# Patient Record
Sex: FEMALE | Race: WHITE
[De-identification: names, ages, dates, MRNs, and addresses within clinical notes are randomized per-mention and may not be internally consistent; named-entity substitution may affect disease eponyms.]

---

## 2017-05-03 ENCOUNTER — HOSPITAL ENCOUNTER (OUTPATIENT)
Dept: HOSPITAL 63 - DXRAD | Age: 62
Discharge: HOME | End: 2017-05-03
Attending: GENERAL PRACTICE
Payer: COMMERCIAL

## 2017-05-03 DIAGNOSIS — R60.9: ICD-10-CM

## 2017-05-03 DIAGNOSIS — M19.071: Primary | ICD-10-CM

## 2017-05-03 PROCEDURE — 73630 X-RAY EXAM OF FOOT: CPT

## 2017-05-03 NOTE — RAD
Right foot, 3 views, 5/3/2017:



History: Foot pain and swelling



No fracture or dislocation is identified. No destructive bony lesion is seen.

There is only minimal degenerative change at scattered interphalangeal joints.

Minimal arterial calcifications are noted. There is mild subcutaneous edema

about the foot.



IMPRESSION: No acute bony abnormality is detected.

## 2020-06-26 ENCOUNTER — HOSPITAL ENCOUNTER (OUTPATIENT)
Dept: HOSPITAL 63 - MAMMO | Age: 65
Discharge: HOME | End: 2020-06-26
Attending: FAMILY MEDICINE
Payer: MEDICARE

## 2020-06-26 DIAGNOSIS — Z12.31: Primary | ICD-10-CM

## 2020-06-26 PROCEDURE — 77067 SCR MAMMO BI INCL CAD: CPT

## 2020-06-26 PROCEDURE — 77063 BREAST TOMOSYNTHESIS BI: CPT

## 2020-06-26 NOTE — RAD
DATE: 6/26/2020 9:00 AM



EXAM: MAMMO EVENS SCREENING BILATERAL



HISTORY:  Screening. . New baseline as previous mammograms from 2005 are no

longer available. 



COMPARISON: None available



TECHNIQUE:



Bilateral CC and MLO views of the breasts were performed. Bilateral breast

tomosynthesis was performed in CC and MLO projections.



This study was interpreted with the benefit of Computerized Aided Detection

(CAD).





FINDINGS:



Breast Density: SCATTERED  The breast parenchyma shows scattered

fibroglandular densities. Breast parenchyma level B



A 7 mm mass in the middle third upper inner right breast at the approximate

1:00 position 8 cm from the nipple needs additional imaging with targeted

right breast ultrasound. No suspicious masses, microcalcifications or

architectural distortion is present to suggest malignancy in the left breast.



IMPRESSION: Right breast mass, findings for which additional imaging is

advised. 



BI-RADS CATEGORY: 0 INCOMPLETE: NEEDS ADDITIONAL IMAGING EVALUATION AND/OR

PRIOR MAMMOGRAMS FOR COMPARISON.



RECOMMENDED FOLLOW-UP: ADD ADDITIONAL IMAGING The patient will be contacted to

return for additional imaging and a supplemental report will follow.



PQRS compliance statement: Patient information was entered into a reminder

system with a target due date  for the next mammogram.



Mammography is a sensitive method for finding small breast cancers, but it

does not detect them all and is not a substitute for careful clinical

examination.  A negative mammogram does not negate a clinically suspicious

finding and should not result in delay in biopsying a clinically suspicious

abnormality.



"Our facility is accredited by the [employer] Mammography

Program."

## 2020-07-02 ENCOUNTER — HOSPITAL ENCOUNTER (OUTPATIENT)
Dept: HOSPITAL 63 - US | Age: 65
Discharge: HOME | End: 2020-07-02
Attending: FAMILY MEDICINE
Payer: MEDICARE

## 2020-07-02 DIAGNOSIS — R92.2: Primary | ICD-10-CM

## 2020-07-02 PROCEDURE — 76641 ULTRASOUND BREAST COMPLETE: CPT

## 2020-07-02 NOTE — RAD
Examination: BREAST RIGHT

 

History: Reason: Call Back Right Breast from screening 6-26-20 / Spl. 

Instructions:  / History: 

 

Comparison/Correlation: 6/26/2020 screening mammographic exam

 

Findings: Limited right breast ultrasound examination was performed. At 

the 1:00 region 8 cm from the nipple, there is a 0.4 cm x 0.4 cm x 0.24 cm

tall hypoechoic well-circumscribed mass present. No flow evident within 

it. Cardiac silhouette is unremarkable. No definite right axillary lymph 

node identified.

 

 

Impression:

BI-RADS Category 4-suspicious for malignancy. Ultrasound-guided biopsy is 

recommended. Referring physician's office was informed on 7/2/2020 at 

12:23 PM.

 

Electronically signed by: Compa Hyde MD (7/2/2020 12:24 PM) [id number]

## 2021-06-16 ENCOUNTER — HOSPITAL ENCOUNTER (OUTPATIENT)
Dept: HOSPITAL 61 - US | Age: 66
Discharge: HOME | End: 2021-06-16
Attending: SURGERY
Payer: COMMERCIAL

## 2021-06-16 DIAGNOSIS — Z79.899: ICD-10-CM

## 2021-06-16 DIAGNOSIS — N63.12: Primary | ICD-10-CM

## 2021-06-16 DIAGNOSIS — R92.8: ICD-10-CM

## 2021-06-16 PROCEDURE — 19083 BX BREAST 1ST LESION US IMAG: CPT

## 2021-06-16 PROCEDURE — 77066 DX MAMMO INCL CAD BI: CPT

## 2021-06-16 PROCEDURE — 76641 ULTRASOUND BREAST COMPLETE: CPT

## 2021-06-16 PROCEDURE — 77065 DX MAMMO INCL CAD UNI: CPT

## 2021-06-16 PROCEDURE — 88305 TISSUE EXAM BY PATHOLOGIST: CPT

## 2021-06-16 NOTE — RAD
US BREAST BIOPSY 1ST LESION, MG DIAGNOSTICUNILAT MAMMO



History:Reason: Right Breast Biopsy 100  8 cmfn / Spl. Instructions:  / History: 



Comparison: June 16, 2021 and July 2, 2020



Procedure:  Relative benefits, risks and alternatives to the procedure were discussed and written inf
ormed consent was obtained.

With sterile technique, local anesthesia and ultrasound guidance, percutaneous biopsy was performed w
ith a 14-gauge needle and multiple passes were obtained through the target.



A marker was placed, and post procedure CC and ML views were obtained.



The procedure was well tolerated. Specimens were sent to Pathology.



The patient was sent home in good condition with written and verbal instructions.



Postclip mammogram: Biopsy marker is 3 mm superior to the breast mass. Increased indistinct appearanc
e of the breast mass likely related to postbiopsy hemorrhage.



Impression:  

1.  Successful ultrasound guided right breast mass biopsy.

2.  Biopsy marker immediately superior to the breast mass on mammogram.



Pathology is pending.



Electronically signed by: Saroj Sevilla DO (6/16/2021 9:40 AM) [id number]

## 2021-06-16 NOTE — RAD
PROCEDURE:  MG DIAGNOSTIC BILAT, US BREAST RT



HISTORY: The patient is 66 years old and is seen for Reason: ABNORMAL MAMMOGRAM RIGHT BREAST / Spl. I
nstructions:  / History: .



COMPARISON: June 26, 2020 mammogram. Ultrasound July 2, 2020



TECHNIQUE: CC and MLO views of both breasts were obtained.  Images were processed by the [employer]
 computer-aided detection system.



DENSITY: There are scattered fibroglandular densities.



FINDINGS:   

Right mammogram:  Irregular mass within the right medial superior breast with increased angular denice
ns. Benign-appearing calcifications.



Right ultrasound hypoechoic right breast mass 1:00 position 8 cm from the nipple measures approximate
ly 0.6 x 0.3 cm, slightly increased compared to prior previously measured 0.4 x 0.2 cm. Slight poster
ior shadowing. No axillary lymphadenopathy.



Left mammogram: Unchanged well-circumscribed left upper outer breast mass, likely intramammary lymph 
node. Unchanged retroareolar asymmetry. Benign-appearing calcification.



IMPRESSION: 

1.  Slight increased right superior medial breast mass. Recommend ultrasound-guided biopsy.



BI-RADS category 4  

Findings suspicious for malignancy



Our clinic nurse has been instructed to assist with communicating findings and recommendations to the
 patient's referring physician and in scheduling follow-up.



Patient entered into a reminder system for annual screening mammogram.





Electronically signed by: Saroj Sevilla DO (6/16/2021 9:33 AM) [email or web address]

## 2021-06-16 NOTE — RAD
PROCEDURE:  MG DIAGNOSTIC BILAT, US BREAST RT



HISTORY: The patient is 66 years old and is seen for Reason: ABNORMAL MAMMOGRAM RIGHT BREAST / Spl. I
nstructions:  / History: .



COMPARISON: June 26, 2020 mammogram. Ultrasound July 2, 2020



TECHNIQUE: CC and MLO views of both breasts were obtained.  Images were processed by the [employer]
 computer-aided detection system.



DENSITY: There are scattered fibroglandular densities.



FINDINGS:   

Right mammogram:  Irregular mass within the right medial superior breast with increased angular denice
ns. Benign-appearing calcifications.



Right ultrasound hypoechoic right breast mass 1:00 position 8 cm from the nipple measures approximate
ly 0.6 x 0.3 cm, slightly increased compared to prior previously measured 0.4 x 0.2 cm. Slight poster
ior shadowing. No axillary lymphadenopathy.



Left mammogram: Unchanged well-circumscribed left upper outer breast mass, likely intramammary lymph 
node. Unchanged retroareolar asymmetry. Benign-appearing calcification.



IMPRESSION: 

1.  Slight increased right superior medial breast mass. Recommend ultrasound-guided biopsy.



BI-RADS category 4  

Findings suspicious for malignancy



Our clinic nurse has been instructed to assist with communicating findings and recommendations to the
 patient's referring physician and in scheduling follow-up.



Patient entered into a reminder system for annual screening mammogram.





Electronically signed by: Saroj Sevilla DO (6/16/2021 9:33 AM) [email or web address]

## 2021-06-17 NOTE — PATHOLOGY
[facility] Accession Number: [id number]

.                                                                01

Material submitted:                                        .

breast - RIGHT BREAST MASS 1 O'CLOCK 8CM FN 0.4CM. Modifiers: right, 1:00,

8CM FROM NIPPLE

.                                                                01

Clinical history:                                          .

RIGHT BREAST MASS 100 8CM FROM NIPPLE

RIGHT BREAST BIOPSY

OBTAINED 0900

FORMALIN 0902

ABD MAMM

.                                                                02

**********************************************************************

Diagnosis:

Breast tissue, right breast mass 1:00 8 cm from nipple needle biopsies:

- Small foci of stromal fibrosis with focal benign small ducts and lobules

showing scattered microcalcifications.

(JPM:cassi; 06/17/2021)

S  06/17/2021  1321 Local

**********************************************************************

.                                                                02

Comment:

There is no atypia or evidence of malignancy.

(JPM:cassi; 06/17/2021)

.                                                                02

Electronically signed:                                     .

Wilman Martinez MD, Pathologist

NPI- [id number]

.                                                                01

Gross description:                                         .

The specimen is received in formalin, labeled "Klaus, Vy, right breast

1:00 8 cm from nipple" received as 4 soft gray-yellow tissue cores

measuring up to 1.9 cm x 0.2 cm.  The specimen is entirely submitted

A1-A2.

The specimen is removed from the patient at 0900 hours and placed in

formalin at 0902 hours on Wednesday, June 16, 2021. The specimen is

removed from formalin at 11:30pm. The specimen is in formalin for greater

than 6 hours and less than 72 hours.

([facility]; 6/16/2021)

ELY/ELY  06/16/2021  1836 Local

.                                                                02

Pathologist provided ICD-10:

N60.31

.                                                                02

CPT                                                        .

[id number]

Specimen Comment: A courtesy copy of this report has been sent to [phone], [phone]

Specimen Comment: [id number]

Specimen Comment: Report sent to ,DR SPEARS / DR UMAÑA

***Performed at:  01

   [facility], [place]  [id number]

   MD Drake Fleming MD Phone:  [phone]

***Performed at:  02

   [facility]

   [address], [place]  [id number]

   MD Wilman Martinez MD Phone:  [phone]